# Patient Record
Sex: MALE | Race: WHITE | URBAN - METROPOLITAN AREA
[De-identification: names, ages, dates, MRNs, and addresses within clinical notes are randomized per-mention and may not be internally consistent; named-entity substitution may affect disease eponyms.]

---

## 2021-01-13 ENCOUNTER — WEB ENCOUNTER (OUTPATIENT)
Dept: URBAN - METROPOLITAN AREA CLINIC 54 | Facility: CLINIC | Age: 31
End: 2021-01-13

## 2021-01-13 ENCOUNTER — DASHBOARD ENCOUNTERS (OUTPATIENT)
Age: 31
End: 2021-01-13

## 2021-01-13 ENCOUNTER — OFFICE VISIT (OUTPATIENT)
Dept: URBAN - METROPOLITAN AREA CLINIC 54 | Facility: CLINIC | Age: 31
End: 2021-01-13
Payer: SELF-PAY

## 2021-01-13 DIAGNOSIS — K58.1 IRRITABLE BOWEL SYNDROME WITH CONSTIPATION: ICD-10-CM

## 2021-01-13 DIAGNOSIS — Q43.9 ANORECTAL ANOMALY: ICD-10-CM

## 2021-01-13 PROBLEM — 440630006: Status: ACTIVE | Noted: 2021-01-13

## 2021-01-13 PROCEDURE — G8427 DOCREV CUR MEDS BY ELIG CLIN: HCPCS | Performed by: INTERNAL MEDICINE

## 2021-01-13 PROCEDURE — 1036F TOBACCO NON-USER: CPT | Performed by: INTERNAL MEDICINE

## 2021-01-13 PROCEDURE — 99214 OFFICE O/P EST MOD 30 MIN: CPT | Performed by: INTERNAL MEDICINE

## 2021-01-13 PROCEDURE — G8420 CALC BMI NORM PARAMETERS: HCPCS | Performed by: INTERNAL MEDICINE

## 2021-01-13 PROCEDURE — 99203 OFFICE O/P NEW LOW 30 MIN: CPT | Performed by: INTERNAL MEDICINE

## 2021-01-13 RX ORDER — B-COMPLEX WITH VITAMIN C
1 TABLET TABLET ORAL ONCE A DAY
Status: ACTIVE | COMMUNITY

## 2021-01-13 RX ORDER — PSYLLIUM HUSK 0.4 G
AS DIRECTED CAPSULE ORAL
Status: ACTIVE | COMMUNITY

## 2021-01-13 RX ORDER — UREA 10 %
1 TABLET LOTION (ML) TOPICAL ONCE A DAY
Status: ACTIVE | COMMUNITY

## 2021-01-13 RX ORDER — POLYETHYLENE GLYCOL 3350, SODIUM SULFATE ANHYDROUS, SODIUM BICARBONATE, SODIUM CHLORIDE, POTASSIUM CHLORIDE 236; 22.74; 6.74; 5.86; 2.97 G/4L; G/4L; G/4L; G/4L; G/4L
AS DIRECTED POWDER, FOR SOLUTION ORAL ONCE
Qty: 30 | Refills: 0 | OUTPATIENT

## 2021-01-13 RX ORDER — MAGNESIUM OXIDE/MAG AA CHELATE 300 MG
1 CAPSULE WITH A MEAL CAPSULE ORAL ONCE A DAY
Status: ACTIVE | COMMUNITY

## 2021-01-13 NOTE — HPI-TODAY'S VISIT:
30-year-old man with family history of colon cancer maternal uncle in his late 50s who reports irregular alternating bowel habits and anorectal discomfort. Patient reports that 1 year ago he had a thrombosed external hemorrhoid that he reduced.  Since then he has been having anorectal discomfort.  Where he feels that he has incomplete evacuation.  He is also reporting string-like stools.  His bowel frequency is 4 to 5/day of small volumes.  He also admits to some anal pruritus and difficulty distinguishing between stool and gas.  Denies any fecal incontinence or bleeding or pus.  He has tried suppositories topical creams and Metamucil without much relief. He reports a history of being constipated for the last 6 months with abdominal distention and bloating.  Denies any prior colonoscopy.

## 2021-01-14 LAB
ENDOMYSIAL ANTIBODY IGA: NEGATIVE
HEMATOCRIT: 45.7
HEMOGLOBIN: 15.6
IMMUNOGLOBULIN A, QN, SERUM: 149
MCH: 31.9
MCHC: 34.1
MCV: 94
NRBC: (no result)
PLATELETS: 225
RBC: 4.89
RDW: 11.8
T-TRANSGLUTAMINASE (TTG) IGA: <2
WBC: 6.2

## 2021-02-04 ENCOUNTER — CLAIMS CREATED FROM THE CLAIM WINDOW (OUTPATIENT)
Dept: URBAN - METROPOLITAN AREA CLINIC 4 | Facility: CLINIC | Age: 31
End: 2021-02-04
Payer: SELF-PAY

## 2021-02-04 ENCOUNTER — OFFICE VISIT (OUTPATIENT)
Dept: URBAN - METROPOLITAN AREA SURGERY CENTER 14 | Facility: SURGERY CENTER | Age: 31
End: 2021-02-04
Payer: SELF-PAY

## 2021-02-04 DIAGNOSIS — K63.89 OTHER SPECIFIED DISEASES OF INTESTINE: ICD-10-CM

## 2021-02-04 DIAGNOSIS — K62.89 ANAL BURNING: ICD-10-CM

## 2021-02-04 PROCEDURE — G8907 PT DOC NO EVENTS ON DISCHARG: HCPCS | Performed by: INTERNAL MEDICINE

## 2021-02-04 PROCEDURE — 45380 COLONOSCOPY AND BIOPSY: CPT | Performed by: INTERNAL MEDICINE

## 2021-02-04 PROCEDURE — 88305 TISSUE EXAM BY PATHOLOGIST: CPT | Performed by: PATHOLOGY

## 2021-07-19 ENCOUNTER — TELEPHONE ENCOUNTER (OUTPATIENT)
Dept: URBAN - METROPOLITAN AREA CLINIC 54 | Facility: CLINIC | Age: 31
End: 2021-07-19